# Patient Record
Sex: FEMALE | Race: WHITE
[De-identification: names, ages, dates, MRNs, and addresses within clinical notes are randomized per-mention and may not be internally consistent; named-entity substitution may affect disease eponyms.]

---

## 2018-09-23 ENCOUNTER — HOSPITAL ENCOUNTER (INPATIENT)
Dept: HOSPITAL 74 - FER | Age: 83
LOS: 2 days | Discharge: HOME HEALTH | DRG: 308 | End: 2018-09-25
Attending: NURSE PRACTITIONER | Admitting: INTERNAL MEDICINE
Payer: COMMERCIAL

## 2018-09-23 VITALS — BODY MASS INDEX: 24.4 KG/M2

## 2018-09-23 DIAGNOSIS — F32.9: ICD-10-CM

## 2018-09-23 DIAGNOSIS — I50.23: ICD-10-CM

## 2018-09-23 DIAGNOSIS — R50.9: ICD-10-CM

## 2018-09-23 DIAGNOSIS — I48.91: Primary | ICD-10-CM

## 2018-09-23 DIAGNOSIS — I11.0: ICD-10-CM

## 2018-09-23 DIAGNOSIS — R00.0: ICD-10-CM

## 2018-09-23 DIAGNOSIS — D72.829: ICD-10-CM

## 2018-09-23 DIAGNOSIS — M19.90: ICD-10-CM

## 2018-09-23 DIAGNOSIS — Z79.01: ICD-10-CM

## 2018-09-23 DIAGNOSIS — N39.0: ICD-10-CM

## 2018-09-23 DIAGNOSIS — E87.6: ICD-10-CM

## 2018-09-23 LAB
ALBUMIN SERPL-MCNC: 3.7 G/DL (ref 3.5–5)
ALP SERPL-CCNC: 95 U/L (ref 32–92)
ALT SERPL-CCNC: 42 U/L (ref 10–40)
ANION GAP SERPL CALC-SCNC: 10 MMOL/L (ref 8–16)
ANION GAP SERPL CALC-SCNC: 10 MMOL/L (ref 8–16)
APTT BLD: 39.5 SECONDS (ref 25.2–36.5)
AST SERPL-CCNC: 44 U/L (ref 10–42)
BACTERIA #/AREA URNS HPF: (no result) /HPF
BASOPHILS # BLD: 0.1 % (ref 0–2)
BILIRUB SERPL-MCNC: 0.8 MG/DL (ref 0.2–1)
BNP SERPL-MCNC: 1610.4 PG/ML (ref 5–450)
BUN SERPL-MCNC: 18 MG/DL (ref 7–18)
BUN SERPL-MCNC: 27 MG/DL (ref 7–18)
CALCIUM SERPL-MCNC: 8.7 MG/DL (ref 8.4–10.2)
CALCIUM SERPL-MCNC: 8.8 MG/DL (ref 8.4–10.2)
CASTS #/AREA URNS LPF: (no result) /HPF
CHLORIDE SERPL-SCNC: 99 MMOL/L (ref 98–107)
CHLORIDE SERPL-SCNC: 99 MMOL/L (ref 98–107)
CO2 SERPL-SCNC: 26 MMOL/L (ref 22–28)
CO2 SERPL-SCNC: 27 MMOL/L (ref 22–28)
CREAT SERPL-MCNC: 0.7 MG/DL (ref 0.6–1.3)
CREAT SERPL-MCNC: 0.7 MG/DL (ref 0.6–1.3)
DEPRECATED RDW RBC AUTO: 14.4 % (ref 11.6–15.6)
EOSINOPHIL # BLD: 0.6 % (ref 0–4.5)
EPITH CASTS URNS QL MICRO: (no result) /HPF
GLUCOSE SERPL-MCNC: 130 MG/DL (ref 74–106)
GLUCOSE SERPL-MCNC: 144 MG/DL (ref 74–106)
HCT VFR BLD CALC: 38.9 % (ref 32.4–45.2)
HGB BLD-MCNC: 12.6 GM/DL (ref 10.7–15.3)
INR BLD: 3.87 (ref 0.82–1.09)
LYMPHOCYTES # BLD: 9.7 % (ref 8–40)
MCH RBC QN AUTO: 29.7 PG (ref 25.7–33.7)
MCHC RBC AUTO-ENTMCNC: 32.5 G/DL (ref 32–36)
MCV RBC: 91.5 FL (ref 80–96)
MONOCYTES # BLD AUTO: 5.5 % (ref 3.8–10.2)
NEUTROPHILS # BLD: 84.1 % (ref 42.8–82.8)
PH UR: 7 [PH] (ref 4.5–8)
PLATELET # BLD AUTO: 309 K/MM3 (ref 134–434)
PMV BLD: 8.5 FL (ref 7.5–11.1)
POTASSIUM SERPLBLD-SCNC: 3.4 MMOL/L (ref 3.5–5.1)
POTASSIUM SERPLBLD-SCNC: 3.5 MMOL/L (ref 3.5–5.1)
PROT SERPL-MCNC: 7.1 G/DL (ref 6.4–8.3)
PROT UR QL STRIP: (no result)
PT PNL PPP: 42.2 SEC (ref 10.2–13)
RBC # BLD AUTO: (no result) /HPF (ref 0–3)
RBC # BLD AUTO: 4.25 M/MM3 (ref 3.6–5.2)
SODIUM SERPL-SCNC: 135 MMOL/L (ref 136–145)
SODIUM SERPL-SCNC: 136 MMOL/L (ref 136–145)
SP GR UR: 1.02 (ref 1–1.02)
UROBILINOGEN UR STRIP-MCNC: 0.2 MG/DL (ref 0.2–1)
WBC # BLD AUTO: 12.7 K/MM3 (ref 4–10.8)
WBC # UR AUTO: (no result) /UL (ref 0–5)

## 2018-09-23 RX ADMIN — ACETAMINOPHEN PRN MG: 325 TABLET ORAL at 23:50

## 2018-09-23 RX ADMIN — DIGOXIN ONE MG: 125 TABLET ORAL at 19:55

## 2018-09-23 RX ADMIN — DIGOXIN ONE: 125 TABLET ORAL at 19:56

## 2018-09-23 NOTE — PDOC
*Physical Exam





- Vital Signs


 Last Vital Signs











Temp Pulse Resp BP Pulse Ox


 


 100.7 F H  92 H  26 H  146/67   97 


 


 09/23/18 06:40  09/23/18 07:12  09/23/18 07:12  09/23/18 06:40  09/23/18 07:12














- Physical Exam


Comments: 





09/23/18 07:42


GENERAL: Awake, alert, and fully oriented, in no acute distress


EYES: PERRLA, sclera anicteric, conjunctiva clear


ENT: Hard of hearing. Moist mucosa


NECK: no JVD


LUNGS: +Rales at R lung base. Good air movement. No crackles. No increased WOB


HEART: Irregularly irregular, rate fluctuating btwn 90s-130s. +3/6 systolic 

ejection murmur


ABDOMEN: Soft, nontender, normoactive bowel sounds.  No guarding, no rebound.  

No masses


RECTAL: no bleeding, temp 100.7


EXTREMITIES: WWP. 1+ symmetric b/l pitting edema to the knees b/l


NEUROLOGICAL:  Normal speech, cranial nerves intact, 5/5 strength in all 4 

extremities, normal sensation to light touch in all 4 extremities


SKIN: Warm, Dry, normal turgor, no rashes or lesions noted.








ED Treatment Course





- LABORATORY


CBC & Chemistry Diagram: 


 09/23/18 06:45





 09/23/18 06:45





- RADIOLOGY


Radiology Studies Ordered: 














 Category Date Time Status


 


 CHEST X-RAY PORTABLE* [RAD] Stat Radiology  09/23/18 07:33 Ordered














- Medications


Given in the ED: 


ED Medications














Discontinued Medications














Generic Name Dose Route Start Last Admin





  Trade Name Hollisq  PRN Reason Stop Dose Admin


 


Acetaminophen  1,000 mg  09/23/18 06:40  09/23/18 07:00





  Ofirmev Injection -  IVPB  09/23/18 06:41  1,000 mg





  ONCE ONE   Administration





     





     





     





     


 


Diltiazem HCl  10 mg  09/23/18 06:33  09/23/18 06:39





  Cardizem Injection -  IVPUSH  09/23/18 06:34  10 mg





  ONCE ONE   Administration





     





     





     





     


 


Furosemide  40 mg  09/23/18 06:58  09/23/18 07:10





  Lasix Injection -  IVPUSH  09/23/18 06:59  40 mg





  ONCE ONE   Administration





     





     





     





     


 


Ceftriaxone Sodium 1 gm/  100 mls @ 200 mls/hr  09/23/18 06:41  09/23/18 07:10





  Dextrose  IVPB  09/23/18 07:10  200 mls/hr





  ONCE ONE   Administration





     





     





  Protocol   





     


 


Nitroglycerin  1.2 mg  09/23/18 06:33  09/23/18 06:39





  Nitrostat -  SL  09/23/18 06:34  1.2 mg





  ONCE ONE   Administration





     





     





     





     


 


Sodium Chloride  250 ml  09/23/18 06:34  09/23/18 07:20





  Normal Saline -  IV  09/23/18 06:35  Not Given





  ONCE ONE   





     





     





     





     














Medical Decision Making





- Medical Decision Making





09/23/18 07:53


Care recieved at 0700


Briefly, 88-year-old female with MMP including CHF presents with acute 

pulmonary edema, fever. Patient is now s/p 3 sublingual nitroglycerin, IV lasix 

40 mg, ceftriaxone 1 g, Zithromax 500mg, acetaminophen 1G, diltiazem 10mg.





On evaluation, HR fluctuating btwn 90s-140s, /80, RR 20, sat 100%





Pt in NAD, states she feels much better, denies CP, +minimal SOB. Exam with 

rales at lung base, systolic murmur, trace to 1+ LE pitting edema.


EKG reviewed, +KIKI in AVR and diffuse inferiolateral STD, likely rate related 

but will repeat once rate controlled. Pt with no CP and minimal SOB at this time

, unlikely ACS. 





Will hold off on bipap in light of response to medications so far and clinical 

improvement


All labs pending


Added digoxin level, CXR


Also ordered home dose of diltiazem 360mg ER (confirmed pt usually takes in AM 

with her daughter)


Will hold off on atenolol given acute decompensated CHF





Likely APE after high salt intake yesterday but given fever, pt may also have 

superimposed PNA. Murmur/RVR likely contributing to pulm edema. 





Discussed dispo with son, pt will need transfer to [facility] ICU vs tele once 

workup more complete. 


Plan to also call cards c/s





09/23/18 10:12


Pt clinically improved


HR now down to 90s-110s


CXR with mild congestion, +cardiomegaly


Case discussed with Dr. Negrete (cards), will send pt down to [facility] tele. 

Also discussed EKG, agrees it likely to be rate related changes and not STEMI 

in absence of CP/SOB 





Case discussed with Dr. Kenny - pt admitted to telemetry





Case discussed in detail with admitting physician including history, physical 

exam and ancillary studies.





Admitting physician has assumed care for the patient, will follow all pending 

diagnostics and will complete the evaluation and treatment.  





09/23/18 14:39


Pt and son do not want to go down to [facility] and prefer to stay at [facility]


Explained to patient that while she is stable now, if her clinical condition 

were to go south, we do not have the same resources here at [facility]. I explained 

that we do not have an ICU here which means if she were to decompensate, she 

would have to be transferred out. I also explained that cardiologist Dr. Negrete recommends she go down to [facility] as well, especially because we do not 

know the extent of her CHF (unknown EF). Pt and son express understanding of 

this recommendation and the risks of staying, but still prefer to stay at [facility]. Pt's son states they have a DNR for their mother should her heart 

stop. 





Updated Dr. Negrete/Efraín about the above. Will request bed at [facility]. 





*DC/Admit/Observation/Transfer


Diagnosis at time of Disposition: 


 Pneumonia, Rapid atrial fibrillation, Pulmonary edema








- Discharge Dispostion


Condition at time of disposition: Guarded


Decision to Admit order: Yes





- Referrals





- Patient Instructions





- Post Discharge Activity





- Attestations


Physician Attestion: 





09/23/18 10:14








I, Dr. Cindy Nichols MD, attest that this document has been prepared under my 

direction and personally reviewed by me in its entirety.   I further attest, 

that it accurately reflects all work, treatment, procedures and medical decision

-making performed by me.

## 2018-09-23 NOTE — EKG
Test Reason : 

Blood Pressure : ***/*** mmHG

Vent. Rate : 113 BPM     Atrial Rate : 092 BPM

   P-R Int : 000 ms          QRS Dur : 080 ms

    QT Int : 338 ms       P-R-T Axes : 000 055 186 degrees

   QTc Int : 463 ms

 

ATRIAL FIBRILLATION WITH RAPID VENTRICULAR RESPONSE

MODERATE VOLTAGE CRITERIA FOR LVH, MAY BE NORMAL VARIANT

MARKED ST ABNORMALITY, POSSIBLE INFEROLATERAL SUBENDOCARDIAL INJURY

ABNORMAL ECG

NO PREVIOUS ECGS AVAILABLE

Confirmed by MD Zachary, Ronan ([id number]) on 9/23/2018 5:07:57 PM

 

Referred By: MD GARCIA           Confirmed By:Ronan Sharma MD

## 2018-09-23 NOTE — HP
CHIEF COMPLAINT: SOB, palpitations, not feeling well





PCP: in [place]





HISTORY OF PRESENT ILLNESS:


This is an 88 year old female with a past medical history significant for afib 

on coumadin and HTN who presented to the ED due to SOB, palpitations and not 

feeling well. Pt was noted to be febrile upon arrival to the ED. Upon exam pt 

reports feeling much better. The SOB and palpitations have resolved.





ER course was notable for:


(1) Trop 0.04


(2) BNP 1610


(3) lactic acid 2.5, then up to 3.4





Recent Travel:


pt denies





PAST MEDICAL HISTORY:


HTN, Afib on coumadin, depression, arthritis


PAST SURGICAL HISTORY:


appendectomy as a child





Social History:


Smoking: pt denies


Alcohol: pt denies


Drugs:  pt denies





Family History:


Family all lived in [facility], unknown COD. Brother  "young" but unknown why





Allergies


Sulfa (Sulfonamide Antibiotics) Allergy (Verified 18 06:32)


 


HOME MEDICATIONS:


 3





 Medication  Instructions  Recorded


 


Atenolol [Tenormin] 25 mg PO DAILY 18


 


Digoxin [Lanoxin -] 0.125 mg PO DAILY 18


 


Diltiazem HCl [Diltiazem ER] 360 mg PO DAILY 18


 


Furosemide [Lasix] 20 mg PO DAILY 18


 


Prednisone 5 mg PO DAILY 18


 


Sertraline HCl 100 mg PO DAILY 18


 


Tramadol HCl 50 mg PO QID PRN  18


 


Warfarin Sodium [Coumadin] 2.5 mg PO DAILY 18








REVIEW OF SYSTEMS


CONSTITUTIONAL: 


Absent:  fever, chills, diaphoresis, generalized weakness, malaise, loss of 

appetite, weight change


HEENT: 


Absent:  rhinorrhea, nasal congestion, throat pain, throat swelling, difficulty 

swallowing, mouth swelling, ear pain, eye pain, visual changes


CARDIOVASCULAR: Present: palpitations


Absent: chest pain, syncope, irregular heart rate, lightheadedness, peripheral 

edema


RESPIRATORY: Present: shortness of breath


Absent: cough, dyspnea with exertion, orthopnea, wheezing, stridor, hemoptysis


GASTROINTESTINAL:


Absent: abdominal pain, abdominal distension, nausea, vomiting, diarrhea, 

constipation, melena, hematochezia


GENITOURINARY: 


Absent: dysuria, frequency, urgency, hesitancy, hematuria, flank pain, genital 

pain


MUSCULOSKELETAL: 


Absent: myalgia, arthralgia, joint swelling, back pain, neck pain


SKIN: 


Absent: rash, itching, pallor


HEMATOLOGIC/IMMUNOLOGIC: 


Absent: easy bleeding, easy bruising, lymphadenopathy, frequent infections


ENDOCRINE:


Absent: unexplained weight gain, unexplained weight loss, heat intolerance, 

cold intolerance


NEUROLOGIC: 


Absent: headache, focal weakness or paresthesias, dizziness, unsteady gait, 

seizure, mental status changes, bladder or bowel incontinence


PSYCHIATRIC: 


Absent: anxiety, depression, suicidal or homicidal ideation, hallucinations.








PHYSICAL EXAMINATION


Vital Signs - 24 hr





 3





  09/23/18 09/23/18 09/23/18





  06:22 06:40 07:12


 


Temperature  100.7 F H 


 


Pulse Rate 152 H  


 


Pulse Rate [  121 H 92 H





Apical]   


 


Respiratory 30 H 24 H 26 H





Rate   


 


Blood Pressure 161/93  


 


Blood Pressure  146/67 





[Arm]   


 


O2 Sat by Pulse 100  97





Oximetry (%)   








 3





  09/23/18 09/23/18 09/23/18





  07:43 10:13 11:11


 


Temperature   99.1 F


 


Pulse Rate   


 


Pulse Rate [ 114 H 103 H 87





Apical]   


 


Respiratory 20 18 20





Rate   


 


Blood Pressure   


 


Blood Pressure 134/74 131/93 147/90





[Arm]   


 


O2 Sat by Pulse 96 98 98





Oximetry (%)   








 3





  09/23/18 09/23/18 09/23/18





  11:48 12:15 13:29


 


Temperature  99.1 F 


 


Pulse Rate 93 H  


 


Pulse Rate [  83 73





Apical]   


 


Respiratory  20 18





Rate   


 


Blood Pressure   


 


Blood Pressure  142/83 133/54 L





[Arm]   


 


O2 Sat by Pulse 99 98 99





Oximetry (%)   








 3





  09/23/18 09/23/18 09/23/18





  15:35 16:58 17:11


 


Temperature  97.8 F 


 


Pulse Rate  68 


 


Pulse Rate [ 65  





Apical]   


 


Respiratory 17 18 





Rate   


 


Blood Pressure  137/82 


 


Blood Pressure 140/83  





[Arm]   


 


O2 Sat by Pulse 98  98





Oximetry (%)   








 3





  09/23/18 09/23/18 09/23/18





  19:00 19:55 20:08


 


Temperature 99.1 F  


 


Pulse Rate 120 H 123 H 


 


Pulse Rate [   





Apical]   


 


Respiratory 21 H  21 H





Rate   


 


Blood Pressure 154/84  


 


Blood Pressure   





[Arm]   


 


O2 Sat by Pulse   98





Oximetry (%)   








GENERAL: Awake, alert, and fully oriented, in no acute distress.


HEAD: Normal with no signs of trauma.


EYES: Pupils equal, round and reactive to light, extraocular movements intact, 

sclera anicteric, conjunctiva clear. No lid lag.


EARS, NOSE, THROAT: Ears normal, nares patent, oropharynx clear without 

exudates. Moist mucous membranes.


NECK: Normal range of motion, supple without lymphadenopathy, JVD, or masses.


LUNGS: Breath sounds equal, clear to auscultation bilaterally. No wheezes, and 

no crackles. No accessory muscle use.


HEART: Irregular rate and rhythm, normal S1 and S2 without murmur, rub or 

gallop.


ABDOMEN: Soft, nontender, not distended, normoactive bowel sounds, no guarding, 

no rebound, no masses.  No hepatomegaly or  splenomegaly. 


MUSCULOSKELETAL: Normal range of motion at all joints. No bony deformities or 

tenderness. No CVA tenderness.


UPPER EXTREMITIES: 2+ pulses, warm, well-perfused. No cyanosis. No clubbing. No 

peripheral edema.


LOWER EXTREMITIES: 2+ pulses, warm, well-perfused. No calf tenderness. No 

peripheral edema. 


NEUROLOGICAL:  Cranial nerves II-XII intact. Normal speech. Normal gait.


PSYCHIATRIC: Cooperative. Good eye contact. Appropriate mood and affect.


SKIN: Warm, dry, normal turgor, no rashes or lesions noted, normal capillary 

refill. 





Laboratory Results - last 24 hr





 3





  18





  06:45 06:45 06:45


 


WBC    12.7 H


 


RBC    4.25


 


Hgb    12.6


 


Hct    38.9


 


MCV    91.5


 


MCH    29.7


 


MCHC    32.5


 


RDW    14.4


 


Plt Count    309


 


MPV    8.5


 


Absolute Neuts (auto)    10.7


 


Neutrophils %    84.1 H


 


Lymphocytes %    9.7


 


Monocytes %    5.5


 


Eosinophils %    0.6


 


Basophils %    0.1


 


PT with INR  42.2 H  


 


INR  3.87 H  


 


PTT (Actin FS)  39.5 H  


 


Sodium   


 


Potassium   


 


Chloride   


 


Carbon Dioxide   


 


Anion Gap   


 


BUN   


 


Creatinine   


 


Creat Clearance w eGFR   


 


Random Glucose   


 


Lactic Acid   


 


Calcium   


 


Total Bilirubin   


 


AST   


 


ALT   


 


Alkaline Phosphatase   


 


Creatine Kinase   


 


Troponin I   


 


B-Natriuretic Peptide   


 


Total Protein   


 


Albumin   


 


Urine Color   Yellow 


 


Urine Appearance   Clear 


 


Urine pH   7.0 


 


Ur Specific Gravity   1.025 


 


Urine Protein   3+ H 


 


Urine Glucose (UA)   Trace 


 


Urine Ketones   Negative 


 


Urine Blood   Trace-intact H 


 


Urine Nitrite   Negative 


 


Urine Bilirubin   Negative 


 


Urine Urobilinogen   0.2 


 


Ur Leukocyte Esterase   Negative 


 


Urine RBC   2-5 


 


Urine WBC   2-5 


 


Ur Epithelial Cells   Few 


 


Urine Bacteria   Few 


 


Urine Casts   Finely granular 0-1 


 


Digoxin   








 3





  18   





  06:45 06:45 07:26 07:31 14:56 15:02


 


WBC      


 


RBC      


 


Hgb      


 


Hct      


 


MCV      


 


MCH      


 


MCHC      


 


RDW      


 


Plt Count      


 


MPV      


 


Absolute Neuts (auto)      


 


Neutrophils %      


 


Lymphocytes %      


 


Monocytes %      


 


Eosinophils %      


 


Basophils %      


 


PT with INR      


 


INR      


 


PTT (Actin FS)      


 


Sodium  135 L     


 


Potassium  3.5     


 


Chloride  99     


 


Carbon Dioxide  26     


 


Anion Gap  10     


 


BUN  27 H     


 


Creatinine  0.7     


 


Creat Clearance w eGFR  > 60     


 


Random Glucose  144 H     


 


Lactic Acid     2.5 H*  3.4 H* 


 


Calcium  8.8     


 


Total Bilirubin  0.8     


 


AST  44 H     


 


ALT  42 H     


 


Alkaline Phosphatase  95 H     


 


Creatine Kinase     29   29


 


Troponin I    0.04    0.06


 


B-Natriuretic Peptide     1610.4 H  


 


Total Protein  7.1     


 


Albumin  3.7     


 


Urine Color      


 


Urine Appearance      


 


Urine pH      


 


Ur Specific Gravity      


 


Urine Protein      


 


Urine Glucose (UA)      


 


Urine Ketones      


 


Urine Blood      


 


Urine Nitrite      


 


Urine Bilirubin      


 


Urine Urobilinogen      


 


Ur Leukocyte Esterase      


 


Urine RBC      


 


Urine WBC      


 


Ur Epithelial Cells      


 


Urine Bacteria      


 


Urine Casts      


 


Digoxin   Cancelled   0.99  








 3





  18





  20:40 20:40 20:40


 


WBC   


 


RBC   


 


Hgb   


 


Hct   


 


MCV   


 


MCH   


 


MCHC   


 


RDW   


 


Plt Count   


 


MPV   


 


Absolute Neuts (auto)   


 


Neutrophils %   


 


Lymphocytes %   


 


Monocytes %   


 


Eosinophils %   


 


Basophils %   


 


PT with INR   


 


INR   


 


PTT (Actin FS)   


 


Sodium   136 


 


Potassium   3.4 L 


 


Chloride   99 


 


Carbon Dioxide   27 


 


Anion Gap   10 


 


BUN   18 


 


Creatinine   0.7 


 


Creat Clearance w eGFR   > 60 


 


Random Glucose   130 H 


 


Lactic Acid    2.1 H


 


Calcium   8.7 


 


Total Bilirubin   


 


AST   


 


ALT   


 


Alkaline Phosphatase   


 


Creatine Kinase   28 


 


Troponin I  0.05  


 


B-Natriuretic Peptide   


 


Total Protein   


 


Albumin   


 


Urine Color   


 


Urine Appearance   


 


Urine pH   


 


Ur Specific Gravity   


 


Urine Protein   


 


Urine Glucose (UA)   


 


Urine Ketones   


 


Urine Blood   


 


Urine Nitrite   


 


Urine Bilirubin   


 


Urine Urobilinogen   


 


Ur Leukocyte Esterase   


 


Urine RBC   


 


Urine WBC   


 


Ur Epithelial Cells   


 


Urine Bacteria   


 


Urine Casts   


 


Digoxin   








ECG


Atrial fibrillation


vent rate 78, 


mod voltage criteria for LVH


ST depression lead II, v4-V6; TWI lead 3, aVF





Radiology Reports


CXR


Impression: Enlarged cardiac silhouette. Mild pulmonary vascular congestion. No 

evidence of airspace consolidation. No sizable pleural effusion. 


Reported By: Grant Vines DO 18 [id number]








ASSESSMENT/PLAN:


88yF with PMH Afib on coumadin, HTN, depression, arthritis presented to the ED 

with SOB and palpitations.





Afib with RVR


   - rate variable 80s-120s, currently febrile. Will give tylenol. if not 

improved then cardizem IVP


   - cardiology consult


   - troponin neg x 3


   - cont tele


supratherapeutic INR


   - no s/s bleeding, hold coumadin


CHF


   - echo in am


   - good response to lasix >2L output


   - cardiology consult


   - will defer daily lasix dosing to cardiology in am


   - daily weights


   - I&O


HTN


   - BP slightly elevated, cont home meds, adjust if remains persistently 

elevated





fever


   - CXR without clear infiltrate, u/a without indication of UTI


   - given zithro and ceftriaxone in ed


   - flu swab taken


   - consider CT scan





Hypokalemia


   - kdur 20mEq po now, repeat in am





depression


   - cont home sertraline





arthritis


   - cont home tramadol





DVT PPX


   - resume home coumadin when INR less than 3





FEN


   - hold IVF


   - BMP in am


   - low sodium diet as tolerated





Dispo: pt currently requires further inpatient management of her emergent 

condition








Visit type





- Emergency Visit


Emergency Visit: Yes


ED Registration Date: 18


Care time: The patient presented to the Emergency Department on the above date 

and was hospitalized for further evaluation of their emergent condition.





- New Patient


This patient is new to me today: Yes


Date on this admission: 18





- Critical Care


Critical Care patient: No





Hospitalist Screening





- Colonoscopy Questionnaire


Colonoscopy Questionnaire: 





Colonoscopy Questionnaire








-   Patient:


50 - 75 years old and never had a screening colonoscopy: No


History of colon or rectal polyps, or CA: Unknown


History of IBD, Crohn's disease or UC: Unknown


History of abdominal radiation therapy as a child: Unknown





-   Relative:


1 with colon or rectal CA, or polyps at age 60 or younger: Unknown


Colon or rectal CA diagnosed at age 45 or younger: Unknown


Multiple relatives with colon or rectal CA: Unknown





-   Outcome:


Screening Result: Negative Screen

## 2018-09-23 NOTE — PDOC
History of Present Illness





- General


Chief Complaint: Shortness of Breath


Stated Complaint: SOB


Time Seen by Provider: 09/23/18 06:20


History Source: Patient, Family


Exam Limitations: No Limitations





- History of Present Illness


Initial Comments: 





09/23/18 06:59


Pt comes with SOB and not feeling well. She is visiting from [facility] where her 

PMD is.


Timing/Duration: 1-3 hours, 4-6 hours


Severity: severe


Associated Symptoms: reports: cough, fever/chills, malaise, shortness of breath

, weakness





Past History





- Travel


Traveled outside of the country in the last 30 days: No





- Past Medical History


Allergies/Adverse Reactions: 


 Allergies











Allergy/AdvReac Type Severity Reaction Status Date / Time


 


Sulfa (Sulfonamide Allergy   Verified 09/23/18 06:32





Antibiotics)     











Home Medications: 


Ambulatory Orders





Atenolol [Tenormin] 25 mg PO DAILY 09/23/18 


Digoxin [Lanoxin -] 0.125 mg PO DAILY 09/23/18 


Diltiazem HCl [Diltiazem ER] 360 mg PO DAILY 09/23/18 


Furosemide [Lasix] 20 mg PO DAILY 09/23/18 


Prednisone 5 mg PO DAILY 09/23/18 


Sertraline HCl 100 mg PO DAILY 09/23/18 


Tramadol HCl PRN 09/23/18 


Warfarin Sodium [Coumadin] 2.5 mg PO DAILY 09/23/18 








Cardiac Disorders: Yes (AFIB)


COPD: No


HTN: Yes


Psychiatric Problems: Yes (DEPRESSION)


Other medical history: ARTHRITIS





- Suicide/Smoking/Psychosocial Hx


Smoking History: Never smoked


Substance Use Type: None





**Review of Systems





- Review of Systems


Able to Perform ROS?: Yes


Is the patient limited English proficient: No


Constitutional: Yes: Diaphoresis, Fever, Malaise.  No: Symptoms Reported, See 

HPI, Chills, Loss of Appetite, Night Sweats, Weakness, Weight Stable, 

Unintentional Wgt. Loss, Unexplained wgt Loss, Other


HEENTM: No: Symptoms Reported, See HPI, Eye Pain, Blurred Vision, Tearing, 

Recent change in vision, Double Vision, Cataracts, Ear Pain, Ocular Prothesis, 

Ear Discharge, Nose Pain, Nose Congestion, Tinnitus, Nose Bleeding, Hearing Loss

, Throat Pain, Throat Swelling, Mouth Pain, Dental Problems, Difficulty 

Swallowing, Mouth Swelling, Other


Respiratory: Yes: Cough, Shortness of Breath, Wheezing.  No: Symptoms reported, 

See HPI, Orthopnea, SOB with Exertion, SOB at Rest, Stridor, Productive cough, 

Hemoptysis, Other


Cardiac (ROS): Yes: Irregular Heart Rate.  No: Symptoms Reported, See HPI, 

Chest Pain, Edema, Lightheadedness, Palpitations, Syncope, Chest Tightness, 

Other


ABD/GI: No: Symptoms Reported, See HPI, Abdominal Distended, Abd. Pain w/ 

defecation, Blood Streaked Bowels, Constipated, Diarrhea, Difficulty Swallowing

, Nausea, Poor Appetite, Poor Fluid Intake, Rectal Bleeding, Vomiting, 

Indigestion, Abdominal cramping, Tarry Stools, Other


: No: Symptoms Reported, See HPI, Burning, Dysuria, Discharge, Frequency, 

Flank Pain, Hematuria, Incontinence, Pain, Urgency, Testicular Mass, Testicular 

Swelling, Lesions, Testicular Pain, Other


Musculoskeletal: No: Symptoms Reported, See HPI, Back Pain, Gout, Joint Pain, 

Joint Swelling, Muscle Pain, Muscle Weakness, Neck Pain, Joint Stiffness, Other


Integumentary: No: Symptoms Reported, See HPI, Bruising, Change in Color, 

Change in Hair/Nails, Dryness, Erythema, Flushing, Lesions, Lumps, Pallor, 

Pruritus, Rash, Sweating, Other


Psychiatric: No: Anxiety, Depression, Frequent Crying, Stressors, Sleep Pattern 

Change, Emotional Problems, Mood Swings, Change in Appetite, Other





*Physical Exam





- Vital Signs


 Last Vital Signs











Temp Pulse Resp BP Pulse Ox


 


    152 H  30 H  161/93   100 


 


    09/23/18 06:22  09/23/18 06:22  09/23/18 06:22  09/23/18 06:22














- Physical Exam


General Appearance: Yes: Nourished, Severe Distress.  No: Appropriately Dressed

, Apparent Distress, Disheveled, Mild Distress, Moderate Distress, Alcohol on 

Breath, Intoxicated, Cachetic, Obese, Thin, Other


HEENT: positive: EOMI, BROWN, Normal ENT Inspection, Normal Voice, Symmetrical, 

TMs Normal, Pharynx Normal.  negative: Pale Conjunctivae, Photophobia, Scleral 

Icterus (R), Scleral Icterus (L), Muffled/Hoarse voice, Pharyngeal Erythema, 

Tonsillar Exudate, Tonsillar Erythema, Nasal Congestion, Rhinorrhea, Sinus 

Tenderness, Orbits, Hearing Decreased, Hearing Grossly Normal, TM Bulging, TM 

Dull, TM Erythema, Lesions, Marks, Excessive drooling, Thrush, Other


Neck: positive: Trachea midline, Supple.  negative: Tender, Normal Thyroid, 

Rigid, Carotid bruit, Decreased range of motion, Stridor, Lymphadenopathy (R), 

Lymphadenopathy (L), Rigidity, Tender lateral, Tender midline, Thyromegaly, 

Other


Respiratory/Chest: positive: Labored Respiration, Rales, Rhonchi.  negative: 

Chest Tender, Lungs Clear, Normal Breath Sounds, Respiratory Distress, 

Accessory Muscle Use, Rapid RR, Decreased Breath Sounds, Paradoxal Breathing, 

Crackles, Stridor, Wheezing, Hyperresonant, Dullness, Plerual Rub, Other


Cardiovascular: positive: Tachycardia, Irregularly Irregular.  negative: 

Regular Rhythm, Regular Rate, S1, S2, Edema, JVD, Murmur, Bradycardia, 

Diastolic Murmur, Systolic Murmur, Gallop/S3, Gallop/S4, Irregular, Other


Female Pelvic Exam: positive: normal external exam.  negative: cervical os 

closed, normal adnexa, normal size ovaries, CMT, discharge, lesions, Bartholin 

mass, Scalene Gland, adnexal tenderness, uterus, Urethra, vaginal bleeding, 

other


Gastrointestinal/Abdominal: positive: Normal Bowel Sounds, Flat, Soft.  negative

: Tender, Organomegaly, Pulsatile Mass, Increased Bowel Sounds, Decreased BS, 

Protuberent, Distended, Guarding, Rebound, Tenderness, Hernia, Mass, 

Hepatomegaly, Spleenomegaly, Other


Musculoskeletal: positive: Normal Inspection.  negative: CVA Tenderness


Extremity: positive: Normal Capillary Refill, Normal Inspection, Normal Range 

of Motion, Pelvis Stable.  negative: Tender, Coldness, Cyanosis, Delayed 

Capillary Refill, Pedal Edema, Swelling, Calf Tenderness, Erythema, Inflammation

, Other


Integumentary: positive: Normal Color, Dry, Warm.  negative: Cyanotic, Erythema

, Jaundice, Mottled, Pale, Cold, Clammy, Diaphoresis, Moist, Hives, Petechiae, 

Rash, Swelling, Ecchymosis, Bruising, Other


Neurologic: positive: CNs II-XII NML intact, Fully Oriented, Alert, Normal Mood/

Affect, Normal Response (pt has pitting edema of legs), Motor Strength 5/5





Medical Decision Making





- Medical Decision Making





09/23/18 07:02


Pt arrived with EMS; BP >188 systolic. 100% NRB; SOB and tachyarrhythmia


Given 3 SLNTG as well as 10 diltiazem.  BP came down to 145systolic and HR went 

from 140s to 80s.


Pt's bloods drawn; lactic acid and blood culture as she is febriole 100.7F


Pt received ofirmev and lasix 40mgIV


Santana cath placed.


Pt received 1g Rocephin.





SHE STILL REQUIRES zithromax





Labs pending.


CXR ordered.


BiPAP called for - on standby, as pt improved with initial care.





Pt likely pneumonia, rapid afib; APE.


She went out yesterday and ate salty foods and things that she usually never 

eats.





Pt will be signed out to the day attending.


She needs admission to med surg [facility]ITAL[facility]





*DC/Admit/Observation/Transfer


Diagnosis at time of Disposition: 


 Pneumonia, Rapid atrial fibrillation, Pulmonary edema








- Discharge Dispostion


Condition at time of disposition: Guarded





- Referrals





- Patient Instructions





- Post Discharge Activity

## 2018-09-24 LAB
ALBUMIN SERPL-MCNC: 3.5 G/DL (ref 3.5–5)
ALP SERPL-CCNC: 65 U/L (ref 32–92)
ALT SERPL-CCNC: 32 U/L (ref 10–40)
ANION GAP SERPL CALC-SCNC: 10 MMOL/L (ref 8–16)
AST SERPL-CCNC: 30 U/L (ref 10–42)
BASOPHILS # BLD: 0.3 % (ref 0–2)
BILIRUB SERPL-MCNC: 1.2 MG/DL (ref 0.2–1)
BUN SERPL-MCNC: 13 MG/DL (ref 7–18)
CALCIUM SERPL-MCNC: 8.5 MG/DL (ref 8.4–10.2)
CHLORIDE SERPL-SCNC: 101 MMOL/L (ref 98–107)
CO2 SERPL-SCNC: 26 MMOL/L (ref 22–28)
CREAT SERPL-MCNC: < 0.6 MG/DL (ref 0.6–1.3)
DEPRECATED RDW RBC AUTO: 14.2 % (ref 11.6–15.6)
EOSINOPHIL # BLD: 0.9 % (ref 0–4.5)
GLUCOSE SERPL-MCNC: 134 MG/DL (ref 74–106)
HCT VFR BLD CALC: 39.2 % (ref 32.4–45.2)
HGB BLD-MCNC: 12.6 GM/DL (ref 10.7–15.3)
INR BLD: 2.1 (ref 0.82–1.09)
LYMPHOCYTES # BLD: 10.2 % (ref 8–40)
MAGNESIUM SERPL-MCNC: 1.9 MG/DL (ref 1.8–2.4)
MCH RBC QN AUTO: 29.6 PG (ref 25.7–33.7)
MCHC RBC AUTO-ENTMCNC: 32.3 G/DL (ref 32–36)
MCV RBC: 91.9 FL (ref 80–96)
MONOCYTES # BLD AUTO: 9.6 % (ref 3.8–10.2)
NEUTROPHILS # BLD: 79 % (ref 42.8–82.8)
PLATELET # BLD AUTO: 267 K/MM3 (ref 134–434)
PMV BLD: 8.2 FL (ref 7.5–11.1)
POTASSIUM SERPLBLD-SCNC: 3.5 MMOL/L (ref 3.5–5.1)
PROT SERPL-MCNC: 6.9 G/DL (ref 6.4–8.3)
PT PNL PPP: 23.2 SEC (ref 10.2–13)
RBC # BLD AUTO: 4.26 M/MM3 (ref 3.6–5.2)
SODIUM SERPL-SCNC: 137 MMOL/L (ref 136–145)
WBC # BLD AUTO: 7.5 K/MM3 (ref 4–10.8)

## 2018-09-24 RX ADMIN — ACETAMINOPHEN PRN MG: 325 TABLET ORAL at 07:19

## 2018-09-24 RX ADMIN — PREDNISONE SCH MG: 5 TABLET ORAL at 09:46

## 2018-09-24 RX ADMIN — ACETAMINOPHEN PRN MG: 325 TABLET ORAL at 17:32

## 2018-09-24 RX ADMIN — ATENOLOL SCH MG: 25 TABLET ORAL at 22:52

## 2018-09-24 RX ADMIN — FUROSEMIDE SCH MG: 20 TABLET ORAL at 09:44

## 2018-09-24 RX ADMIN — CEFTRIAXONE SCH MLS/HR: 1 INJECTION, POWDER, FOR SOLUTION INTRAMUSCULAR; INTRAVENOUS at 09:45

## 2018-09-24 RX ADMIN — DIGOXIN SCH MG: 125 TABLET ORAL at 09:45

## 2018-09-24 RX ADMIN — ATENOLOL SCH MG: 25 TABLET ORAL at 09:45

## 2018-09-24 RX ADMIN — Medication SCH TAB: at 10:30

## 2018-09-24 RX ADMIN — SERTRALINE HYDROCHLORIDE SCH MG: 50 TABLET ORAL at 09:44

## 2018-09-24 NOTE — ECHO
______________________________________________________________________________



Name: SEMETIS, FOFO                                    Exam:Adult Echocardiogram

MRN: [id number]         Study Date: 2018 02:07 PM

Age: 88 yrs

______________________________________________________________________________



Reason For Study: CHF

Height: 60 in        Weight: 126 lb        BSA: 1.5 m2



______________________________________________________________________________



MMode/2D Measurements & Calculations

IVSd: 1.6 cm                                          Ao root diam: 2.6 cm

LVIDd: 3.2 cm                                         LA dimension: 4.3 cm

LVIDs: 2.4 cm

LVPWd: 1.5 cm



_______________________________________________________

EDV(Teich): 40.8 ml                                   LVOT diam: 2.0 cm

ESV(Teich): 20.1 ml



Doppler Measurements & Calculations

MV E max jefry: 160.7 cm/sec

MV A max jefry: 86.4 cm/sec                             MV dec slope: 908.0 cm/sec2

MV E/A: 1.9



_______________________________________________________

Ao V2 max: 290.0 cm/sec                               LV V1 max P.6 mmHg

Ao max P.8 mmHg                                  LV V1 mean P.1 mmHg

Ao V2 mean: 203.9 cm/sec                              LV V1 max: 176.5 cm/sec

Ao mean P.4 mmHg                                 LV V1 mean: 133.1 cm/sec

Ao V2 VTI: 66.3 cm                                    LV V1 VTI: 39.1 cm



VELASQUEZ(I,D): 1.8 cm2

VELASQUEZ(V,D): 1.9 cm2

_______________________________________________________



MR max jefry: 471.9 cm/sec                              SV(LVOT): 120.3 ml

MR max P.8 mmHg

_______________________________________________________



TR max jefry: 253.2 cm/sec                              PI end-d jefry: 113.1 cm/sec

TR max P.9 mmHg



______________________________________________________________________________



Procedure

A complete two-dimensional transthoracic echocardiogram was performed (2D, M-mode, Doppler and color 
flow

Doppler).

Left Ventricle

The left ventricle is normal in size. There is moderate concentric left ventricular hypertrophy. Left


ventricular systolic function is normal. Ejection Fraction = 60-65%. No regional wall motion abnormal
ities

noted.

Right Ventricle

The right ventricle is normal size. The right ventricular systolic function is normal.

Atria

The left atrium is mildly dilated. Right atrial size is normal.

Mitral Valve

There is moderate mitral annular calcification. There is mild mitral regurgitation.

Tricuspid Valve

The tricuspid valve is normal in structure and function. There is moderate tricuspid regurgitation. P
ulmonary

artery systolic pressure is at least 44 mmHg assuming RA pressure of 3 mmHg.

Aortic Valve

There is moderate aortic sclerosis.;. Moderate aortic regurgitation.

Pulmonic Valve

The pulmonic valve is not well visualized. Mild pulmonic valvular regurgitation.

Great Vessels

The aortic root is normal size.

Pericardium/Pleura

There is no pericardial effusion.

______________________________________________________________________________





Interpretation Summary

The left ventricle is normal in size.

There is moderate concentric left ventricular hypertrophy.

Left ventricular systolic function is normal.

No regional wall motion abnormalities noted.

Ejection Fraction = 60-65%.

The right ventricular systolic function is normal.

The left atrium is mildly dilated.

Right atrial size is normal.

There is moderate mitral annular calcification.

There is mild mitral regurgitation.

There is moderate tricuspid regurgitation.

Pulmonary artery systolic pressure is at least 44 mmHg assuming RA pressure of 3 mmHg

There is moderate aortic sclerosis. Possilbe mild aortic valve stenosis with mean gradient of 19 mmHg


Moderate aortic regurgitation.

Mild pulmonic valvular regurgitation.

There is no pericardial effusion.



Prior study is not available for comparison





Delgado rAgueta MD 2018 06:33 PM

## 2018-09-24 NOTE — CON.CARD
Cardiology Consult (text)





- Consultation


Consultation Note: 





Cardiology Consult





88F with AF on Warfarin, presents to ER with SOB and pulmonary vascular 

congestion in setting of dietary indiscretion. Also found to febrile and + UTI


She is a poor historian; a call was placed to her son to obtain further hx.


Today, she denies CP, SOB- stating she feels beter. 


No PND or orthopnea overnight.


+ fever and chills.





Denies hx of MI or coronary revasc.





PMH:


AF on Coumadin


HTN


Unclear why on Prednisone daily








ALL:


Sulfa








FH:


Non-contrib





SH:


Lives in [place], visiting family.








DATA:


Microbiology





09/23/18 07:31   Urine - Urine - Catheterized   Urine Culture - Preliminary


                              Lactose Fermenting Neg Bacilli


09/23/18 06:45   Blood - Peripheral Venous   Blood Culture - Preliminary


                              NO GROWTH OBTAINED AFTER 24 HOURS, INCUBATION TO 

CONTINUE


                              FOR 4 DAYS.


09/23/18 06:45   Blood - Peripheral Venous   Blood Culture - Preliminary


                              NO GROWTH OBTAINED AFTER 24 HOURS, INCUBATION TO 

CONTINUE


                              FOR 4 DAYS.





 Selected Entries











  09/23/18 09/24/18





  23:00 08:16


 


Temperature 101.3 F H 99.3 F


 


Pulse Rate  121 H


 


Blood Pressure  150/60








 Laboratory Tests











  09/23/18 09/23/18 09/23/18





  07:31 15:02 20:40


 


WBC   


 


Hgb   


 


Plt Count   


 


INR   


 


Sodium   


 


Potassium   


 


BUN   


 


Creatinine   


 


Lactic Acid   


 


Calcium   


 


Magnesium   


 


Total Bilirubin   


 


AST   


 


ALT   


 


Alkaline Phosphatase   


 


Troponin I   0.06  0.05


 


B-Natriuretic Peptide  1610.4 H  














  09/23/18 09/24/18 09/24/18





  20:40 07:00 07:00


 


WBC   7.5 


 


Hgb   12.6 


 


Plt Count   267 


 


INR   


 


Sodium    137


 


Potassium    3.5


 


BUN    13


 


Creatinine    < 0.6 L


 


Lactic Acid  2.1 H  


 


Calcium    8.5


 


Magnesium    1.9


 


Total Bilirubin    1.2 H


 


AST    30  D


 


ALT    32  D


 


Alkaline Phosphatase    65  D


 


Troponin I   


 


B-Natriuretic Peptide   














  09/24/18





  07:00


 


WBC 


 


Hgb 


 


Plt Count 


 


INR  2.10 H


 


Sodium 


 


Potassium 


 


BUN 


 


Creatinine 


 


Lactic Acid 


 


Calcium 


 


Magnesium 


 


Total Bilirubin 


 


AST 


 


ALT 


 


Alkaline Phosphatase 


 


Troponin I 


 


B-Natriuretic Peptide 











Exam:


S1, 2. RRR. Loud systolic murmur audible throughout precordium @ RUSB and at 

apex


No carotid bruits


Chest CTA this AM


Abd soft and NT


Ext no edema





ECG:


AF with LVH and diffuse NSST changes likely due to dig effect/ +/- repol changes

















IMP:


Chronic AF with RVR in setting of UTI, fever and decompensated CHF


Chronic CHF: ? systolic vs diastolic vs valvular vs combined


UTI


?PNA








REC:


1. Continue tele


2. Echo for EF assessment and to assess valvular fxn, murmur- r/o AS


3. INR goal 2-3.


4. Would increase Atenolol to BID dosing as early AM heart rate is elevated.


5. Abx as per PMD


6. Call has been placed to son to clarify history.

## 2018-09-24 NOTE — EKG
Test Reason : 

Blood Pressure : ***/*** mmHG

Vent. Rate : 078 BPM     Atrial Rate : 068 BPM

   P-R Int : 000 ms          QRS Dur : 086 ms

    QT Int : 398 ms       P-R-T Axes : 000 065 237 degrees

   QTc Int : 453 ms

 

ATRIAL FIBRILLATION

MODERATE VOLTAGE CRITERIA FOR LVH, MAY BE NORMAL VARIANT

MARKED ST ABNORMALITY, POSSIBLE INFERIOR SUBENDOCARDIAL INJURY

ABNORMAL ECG

WHEN COMPARED WITH ECG OF 23-SEP-2018 06:36,

T WAVE INVERSION LESS EVIDENT IN LATERAL LEADS

Confirmed by LAURITA WADSWORTH MD ([id number]) on 9/24/2018 12:18:07 PM

 

Referred By: LOR HUMMEL           Confirmed By:LAURITA WADSWORTH MD

## 2018-09-24 NOTE — PN
Physical Exam: 


SUBJECTIVE: Patient seen and examined, patient is sitting up in bed, eating 

breakfast, denies any chest pain or shortness of breath.  reports discomfort 

from guo cather.  





OBJECTIVE:  Patient is a 87 y/o female with a past medical history of afib (

coumadin),  hypertension, HLD, and depression. Patient was admitted from the 

emergency department for rapid afib and fever of unknown injury.  





 Vital Signs











 Period  Temp  Pulse  Resp  BP Sys/Iqbal  Pulse Ox


 


 Last 24 Hr  97.8 F-101.3 F    17-21  131-162/54-93  91-99











GENERAL: The patient is awake, alert, and fully oriented, in no acute distress.


HEAD: Normal with no signs of trauma.


EYES: PERRL, extraocular movements intact, sclera anicteric, conjunctiva clear. 

No ptosis. 


ENT: Ears normal, nares patent, oropharynx clear without exudates, moist mucous 


membranes.


NECK: Trachea midline, full range of motion, supple. 


LUNGS: Breath sounds equal, clear to apexes, fine crackles to bases, no wheezes

, no accessory muscle use. 


HEART: irregular rate and rhythm, S1, S2 without murmur, rub or gallop.


ABDOMEN: Soft, nontender, nondistended, normoactive bowel sounds, no guarding, 

no 


rebound, no hepatosplenomegaly, no masses.


: guo clear yellow urine draining 


EXTREMITIES: 2+ pulses, warm, well-perfused, +1 pre-tibial pitting edema to 

bilateral lower extremity.   


NEUROLOGICAL: Cranial nerves II through XII grossly intact. Normal speech, gait 

not 


observed.


PSYCH: Normal mood, normal affect.


SKIN: Warm, dry, normal turgor, no rashes or lesions noted














 Laboratory Results - last 24 hr








 CBC











WBC  7.5 K/mm3 (4.0-10.8)   09/24/18  07:00    


 


RBC  4.26 M/mm3 (3.60-5.2)   09/24/18  07:00    


 


Hgb  12.6 GM/dl (10.7-15.3)   09/24/18  07:00    


 


Hct  39.2 % (32.4-45.2)   09/24/18  07:00    


 


MCV  91.9 fl (80-96)   09/24/18  07:00    


 


MCH  29.6 pg (25.7-33.7)   09/24/18  07:00    


 


MCHC  32.3 g/dl (32.0-36.0)   09/24/18  07:00    


 


RDW  14.2 % (11.6-15.6)   09/24/18  07:00    


 


Plt Count  267 K/MM3 (134-434)   09/24/18  07:00    


 


MPV  8.2 fl (7.5-11.1)   09/24/18  07:00    


 


Absolute Neuts (auto)  5.9 K/mm3  09/24/18  07:00    


 


Neutrophils %  79.0 % (42.8-82.8)   09/24/18  07:00    


 


Lymphocytes %  10.2 % (8-40)   09/24/18  07:00    


 


Monocytes %  9.6 % (3.8-10.2)   09/24/18  07:00    


 


Eosinophils %  0.9 % (0-4.5)   09/24/18  07:00    


 


Basophils %  0.3 % (0-2.0)   09/24/18  07:00    








 CMP











Sodium  137 mmol/L (136-145)   09/24/18  07:00    


 


Potassium  3.5 mmol/L (3.5-5.1)   09/24/18  07:00    


 


Chloride  101 mmol/L ()   09/24/18  07:00    


 


Carbon Dioxide  26 mmol/L (22-28)   09/24/18  07:00    


 


Anion Gap  10 MMOL/L (8-16)   09/24/18  07:00    


 


BUN  13 mg/dl (7-18)   09/24/18  07:00    


 


Creatinine  < 0.6 mg/dl (0.6-1.3)  L  09/24/18  07:00    


 


Creat Clearance w eGFR  > 60  (>60)   09/24/18  07:00    


 


Random Glucose  134 mg/dl ()  H  09/24/18  07:00    


 


Lactic Acid  2.1 mmol/L (0.4-2.0)  H  09/23/18  20:40    


 


Calcium  8.5 mg/dl (8.4-10.2)   09/24/18  07:00    


 


Magnesium  1.9 mg/dL (1.8-2.4)   09/24/18  07:00    


 


Total Bilirubin  1.2 mg/dl (0.2-1.0)  H  09/24/18  07:00    


 


AST  30 U/L (10-42)  D 09/24/18  07:00    


 


ALT  32 U/L (10-40)  D 09/24/18  07:00    


 


Alkaline Phosphatase  65 U/L (32-92)  D 09/24/18  07:00    


 


Creatine Kinase  28 IU/L ()   09/23/18  20:40    


 


Troponin I  0.05 ng/ml (0.00-0.06)   09/23/18  20:40    


 


B-Natriuretic Peptide  1610.4 pg/ml (5-450)  H  09/23/18  07:31    


 


Total Protein  6.9 g/dl (6.4-8.3)   09/24/18  07:00    


 


Albumin  3.5 g/dl (3.5-5.0)   09/24/18  07:00    








 Laboratory Tests











  09/24/18





  07:00


 


INR  2.10 H























Active Medications











Generic Name Dose Route Start Last Admin





  Trade Name Freq  PRN Reason Stop Dose Admin


 


Acetaminophen  650 mg  09/23/18 23:44  09/24/18 07:19





  Tylenol -  PO   650 mg





  Q6H PRN   Administration





  FEVER   





     





     





     


 


Atenolol  25 mg  09/23/18 20:00  09/23/18 19:57





  Tenormin -  PO   25 mg





  DAILY KRISTINE   Administration





     





     





     





     


 


Digoxin  0.125 mg  09/24/18 10:00  





  Lanoxin -  PO   





  DAILY KRISTINE   





     





     





     





     


 


Diltiazem HCl  360 mg  09/24/18 10:00  





  Cardizem Cd -  PO   





  DAILY [facility]   





     





     





     





     


 


Potassium Chloride  40 meq  09/24/18 08:11  





  K-Dur -  PO  09/24/18 08:12  





  ONCE ONE   





     





     





     





     


 


Prednisone  5 mg  09/24/18 10:00  





  Deltasone -  PO   





  DAILY KRISTINE   





     





     





     





     


 


Sertraline HCl  100 mg  09/24/18 10:00  





  Zoloft -  PO   





  DAILY KRISTINE   





     





     





     





     


 


Tramadol HCl  50 mg  09/23/18 23:12  09/24/18 00:29





  Ultram -  PO   50 mg





  QID PRN   Administration





  PAIN LEVEL 6-10   





     





     





     








 Microbiology





09/23/18 06:45   Blood - Peripheral Venous   Blood Culture - Preliminary


                            NO GROWTH OBTAINED AFTER 24 HOURS, INCUBATION TO 

CONTINUE


                            FOR 4 DAYS.


09/23/18 06:45   Blood - Peripheral Venous   Blood Culture - Preliminary


                            NO GROWTH OBTAINED AFTER 24 HOURS, INCUBATION TO 

CONTINUE


                            FOR 4 DAYS.


09/24/18 00:00   Nasopharyngeal Swab   Influenza Types A,B Antigen - Final, 

negative 


09/24/18 00:00   Nasopharyngeal Swab    - Final, negative 


 


09/23/18 07:31   Urine - Urine - Catheterized   Urine Culture - Preliminary


                            Lactose Fermenting Neg Bacilli








IMAGING


chest xray: mild pulmolnary vascular congestion, no infilitrate 





ASSESSMENT/PLAN:





1) cardiovascular


Afib with RVR


- continous cardiac monitoring, rate remains 110-120's patient is asymptomatic, 

will give PO dose of cardizem now, cardizem 10mg IV x 1 ordered, continue home 

dose atenolol


- pending echo today


- troponin x 3 wnl


- restart coumadin today 


-appreciate cardiology input


acute excerbation of chronic congestive heart failure


- pending echo today


- pt diuressed yesterday, 1.1kg weight loss noted, will continue home dose 

cardizem


- repeat cxr today 


hypertension


- b/p is above goal, increase atenolol to bid and continue cardizem, strict  b/

p monitoring q4h





2) sepsis


secondary to UTI


- prelim urine culture, lactose fermenting negative bacili, will start rocephin

, pending final urine culture, blood culture is negative to date, leukocytosis 

resolved,  low grade temp is noted





3) psych


depression


- continue home dose zoloft





f/e/n


- replete potassium and mg


- low sodium diet





ppx


-  resume home coumadin 


-  pepcid


- physical therapy evaluation 











Dispo: pt currently requires further inpatient management of her emergent 

condition





Visit type





- Emergency Visit


Emergency Visit: Yes


ED Registration Date: 09/23/18


Care time: The patient presented to the Emergency Department on the above date 

and was hospitalized for further evaluation of their emergent condition.





- New Patient


This patient is new to me today: Yes


Date on this admission: 09/24/18





- Critical Care


Critical Care patient: No





- Discharge Referral


Referred to [facility] No

## 2018-09-25 VITALS — HEART RATE: 61 BPM

## 2018-09-25 VITALS — TEMPERATURE: 98.4 F | DIASTOLIC BLOOD PRESSURE: 67 MMHG | SYSTOLIC BLOOD PRESSURE: 154 MMHG

## 2018-09-25 LAB
INR BLD: 1.76 (ref 0.82–1.09)
PT PNL PPP: 19.5 SEC (ref 10.2–13)

## 2018-09-25 RX ADMIN — DIGOXIN SCH MG: 125 TABLET ORAL at 10:39

## 2018-09-25 RX ADMIN — FUROSEMIDE SCH MG: 20 TABLET ORAL at 10:39

## 2018-09-25 RX ADMIN — CEFTRIAXONE SCH MLS/HR: 1 INJECTION, POWDER, FOR SOLUTION INTRAMUSCULAR; INTRAVENOUS at 10:40

## 2018-09-25 RX ADMIN — Medication SCH TAB: at 10:39

## 2018-09-25 RX ADMIN — PREDNISONE SCH MG: 5 TABLET ORAL at 10:39

## 2018-09-25 RX ADMIN — SERTRALINE HYDROCHLORIDE SCH MG: 50 TABLET ORAL at 10:40

## 2018-09-25 NOTE — PN
Progress Note (short form)





- Note


Progress Note: 





ID Consult dictated


+ blood c/s probable contaminant


UTI


Fever/ leukocytosis  improved


Repeat BC


OK for discharge on po keflex 500mg po bid x7d

## 2018-09-25 NOTE — DS
Physical Exam: 


SUBJECTIVE: Patient seen and examined, sitting at bedside recliner, tolerating 

diet reports feeling well denies any chest pain palpitations and shortness of 

breath, ready for discharge home








OBJECTIVE:This is an 88 year old female with a past medical history significant 

for afib on coumadin and HTN who presented to the ED due to SOB, palpitations 

and not feeling well. Pt was noted to be febrile upon arrival to the ED. Upon 

exam pt reports feeling much better. The SOB and palpitations have resolved.





ER course was notable for:


(1) Trop 0.04


(2) BNP 1610


(3) lactic acid 2.5, then up to 3.4





 Vital Signs











 Period  Temp  Pulse  Resp  BP Sys/Iqbal  Pulse Ox


 


 Last 24 Hr  97.6 F-98.5 F  58-67  18-20  121-154/52-67  94-98








PHYSICAL EXAM





GENERAL: The patient is awake, alert, and fully oriented, in no acute distress.


HEAD: Normal with no signs of trauma.


EYES: PERRL, extraocular movements intact, sclera anicteric, conjunctiva clear. 


ENT: Ears normal, nares patent, oropharynx clear without exudates, moist mucous 

membranes.


NECK: Trachea midline, full range of motion, supple. 


LUNGS: Breath sounds equal, clear to auscultation bilaterally, no wheezes, no 

crackles, no accessory muscle use. 


HEART: irregular rate and rhythm, S1, S2 without murmur, rub or gallop.


ABDOMEN: Soft, nontender, nondistended, normoactive bowel sounds, no guarding, 

no rebound, no hepatosplenomegaly, no masses.


EXTREMITIES: 2+ pulses, warm, well-perfused, no edema. 


NEUROLOGICAL: Cranial nerves II through XII grossly intact. Normal speech, gait 

not observed.


PSYCH: Normal mood, normal affect.


SKIN: Warm, dry, normal turgor, no rashes or lesions noted.





LABS


 Laboratory Results - last 24 hr











  09/25/18





  06:55


 


PT with INR  19.5 H


 


INR  1.76 H





 Microbiology





09/23/18 06:45   Blood - Peripheral Venous   Blood Culture - Preliminary


                            Pending Organism


09/23/18 06:45   Blood - Peripheral Venous   Blood Culture - Preliminary


                            NO GROWTH OBTAINED AFTER 48 HOURS, INCUBATION TO 

CONTINUE


                            FOR 3 DAYS.


09/23/18 07:31   Urine - Urine - Catheterized   Urine Culture - Final


                            Escherichia Coli


09/24/18 00:00   Nasopharyngeal Swab   Influenza Types A,B Antigen - Final


09/24/18 00:00   Nasopharyngeal Swab    - Final





 


IMAGING 


cxr 9/24/18: no CHFor pneumonia noted





Echo 09/24/2018 moderate concentric LVH, EF 60-65% moderate TR and mild AS





HOSPITAL COURSE:


1) Afib with RVR


-rate better controlled today 60-70 on cardiac monitoring continuos, continue 

atenolol with parameters and cardizem 


- troponin x 3 wnl


- coumadin restarte 9/24/18-->home dose 


- cardiology Dr Osuna consulted and followed 


acute excerbation of chronic systolic congestive heart failure


- diuressed on  hospital day 1, 1 kg weight loss, Patient appears euvolemic on 

exam, continue home dose Lasix 





hypertension


- BP: Continue Cardizem and atenolol





2) sepsis


resolved secondary to UTI


-One set of blood cultures positive discussed with Dr Graves, ID, likely 

contaminant,  repeat blood cultures ordered, leukocytosis resolved will 

continue rocephin 





3) psych


depression


- continue home dose zoloft





PLAN


- discharge home with keflex 500mg bid for 7 days


- repeat inr on thursday, september 27th


- discharge plan and instructions discussed with daughter-in-law Annalise, 

verbalizes understanding all questions answered





Date of Admission:09/23/18





Date of Discharge: 09/25/18











Minutes to complete discharge: 45





Discharge Summary


Reason For Visit: PULMONARY EDEMA,  RAPID ATRIAL FIBRILLATION


Current Active Problems





Pneumonia (Acute)


Pulmonary edema (Acute)


Rapid atrial fibrillation (Acute)








Condition: Improved





- Instructions


Diet, Activity, Other Instructions: 


your atenolol was increased to twice daily as per the recommendation of the 

cardiologist


continue Cardizem daily as prescribed


Continue lasix prescribed please continue to weigh yourself daily in the morning


continue Coumadin 2.5 mg daily as prescribed and repeat INR on Thursday, September 27, 2018


your urine culture was positive for bacteria and  you were treated for urinary 

tract infection while in the hospital please continue Keflex (antibiotic) twice 

a day for the next 7 days


Please continue probiotic daily





Please follow-up with your cardiologist within 2 weeks


Please follow-up with your primary care physician within one week for a repeat 

urinalysis and urine culture


Please repeat your INR on Thursday, 09/27/2018


Referrals: 


Jesus Osuna MD [Staff Physician] - 


Disposition: VNS/HOME HEALTH CARE





- Home Medications


Comprehensive Discharge Medication List: 


Ambulatory Orders





Atenolol [Tenormin] 25 mg PO DAILY 09/23/18 


Digoxin [Lanoxin -] 0.125 mg PO DAILY 09/23/18 


Diltiazem HCl [Diltiazem ER] 360 mg PO DAILY 09/23/18 


Furosemide [Lasix] 20 mg PO DAILY 09/23/18 


Prednisone 5 mg PO DAILY 09/23/18 


Sertraline HCl 100 mg PO DAILY 09/23/18 


Tramadol HCl 50 mg PO QID PRN  09/23/18 


Warfarin Sodium [Coumadin] 2.5 mg PO DAILY 09/23/18 








This patient is new to me today: No


Emergency Visit: Yes


ED Registration Date: 09/23/18


Care time: The patient presented to the Emergency Department on the above date 

and was hospitalized for further evaluation of their emergent condition.


Critical Care patient: No





- Discharge Referral


Referred to [facility] No

## 2018-09-25 NOTE — CONS
DATE OF CONSULTATION:  

 

DATE OF DICTATION:  02/25/2018 

 

An 88-year-old female evaluated for positive blood culture.  She was admitted to the

hospital on September 23, 2018, with shortness of breath.  She was diagnosed with

atrial fibrillation with rapid ventricular response and congestive heart failure. 

Her course was complicated by fever and elevated white blood cell count.  Cultures

were obtained.  She was empirically treated with ceftriaxone.  Urine culture was

positive for E. coli.  Blood cultures are now positive for gram-positive cocci in

chains in 1 bottle.  

 

She is clinically improved.  She is out of bed to chair.  She complains of

generalized pain; however, denies any chest pain, shortness of breath, cough or

sputum production.  No dysuria or hematuria.  No skin infections.  An echocardiogram

was performed and was negative for valvular vegetations.  Repeat cultures are

pending.  

 

PAST MEDICAL HISTORY:  Positive for hypertension, atrial fibrillation,

osteoarthritis, depression.

 

ALLERGIES:  SULFA.

 

MEDICATIONS:  Atenolol, Lanoxin, diltiazem, Lasix, prednisone, tramadol, Coumadin.

 

SOCIAL HISTORY:  She lives at home.  She is a nonsmoker, nondrinker.  

 

REVIEW OF SYSTEMS:  

Neurologic:  No loss of consciousness, seizure activity, focal weakness.

Cardiac:  Negative for chest pain or palpitations.

Respiratory:  Negative for cough or sputum production.  

Gastrointestinal:  Negative vomiting or diarrhea.

Genitourinary:  Positive for urinary tract infection.

 

LABORATORY DATA:  White count on admission 12.7, presently 7.5.  Hematocrit 39.2,

platelet count 267, creatinine 0.6.  Blood cultures:  Gram-positive cocci in chains,

1 bottle.  Urinalysis:  White cells 2-5.  Urine culture:  E. coli.  Chest x-ray

negative for acute infiltrate.  

 

PHYSICAL EXAMINATION:  

General:  She is out of bed to chair.  She is not acutely toxic appearing.  Elderly

female.

Vital Signs:  Temperature 98.4, T-max 101.3.  Blood pressure 154/67, pulse 65,

irregular.  Respirations 20 per minute.  

HEENT:  Sclerae are anicteric. 

Cardiovascular:  Heart sounds irregular S1, S2.  No murmur.  

Lungs:  Clear bilaterally.  

Abdomen:  Soft.  No tenderness elicited.  No mass, rebound, or rigidity.  

Extremities:  Edema 1+.  No evidence of skin infection noted.  

 

IMPRESSION:  

1.  Status post rapid atrial fibrillation/congestive heart failure.

2.  Escherichia coli urinary tract infection. 

3.  Positive blood culture for alpha streptococcus in 1 bottle.  

 

Blood culture likely contaminant.  Will repeat blood cultures.  May substitute Keflex

500 mg p.o. b.i.d. for an additional 7 days for treatment of urinary tract infection.

 No objection to discharge.  

 

Thank you for the kind referral.  

 

 

EMILIA ROJAS M.D.

 

[id number]

DD: 09/25/2018 11:33

DT: 09/25/2018 12:00

Job #:  [id number]

## 2018-09-25 NOTE — PN
Physical Exam: 


SUBJECTIVE: Patient seen and examined








OBJECTIVE:





 Vital Signs











 Period  Temp  Pulse  Resp  BP Sys/Iqbal  Pulse Ox


 


 Last 24 Hr  97.6 F-98.5 F  58-67  18-20  121-154/52-67  94-98











GENERAL: The patient is awake, alert, and fully oriented, in no acute distress.


HEAD: Normal with no signs of trauma.


EYES: PERRL, extraocular movements intact, sclera anicteric, conjunctiva clear. 

No ptosis. 


ENT: Ears normal, nares patent, oropharynx clear without exudates, moist mucous 


membranes.


NECK: Trachea midline, full range of motion, supple. 


LUNGS: Breath sounds equal, clear to auscultation bilaterally, no wheezes, no 

crackles, no 


accessory muscle use. 


HEART: Regular rate and rhythm, S1, S2 without murmur, rub or gallop.


ABDOMEN: Soft, nontender, nondistended, normoactive bowel sounds, no guarding, 

no 


rebound, no hepatosplenomegaly, no masses.


EXTREMITIES: 2+ pulses, warm, well-perfused, no edema. 


NEUROLOGICAL: Cranial nerves II through XII grossly intact. Normal speech, gait 

not 


observed.


PSYCH: Normal mood, normal affect.


SKIN: Warm, dry, normal turgor, no rashes or lesions noted














 Laboratory Results - last 24 hr











  09/25/18





  06:55


 


PT with INR  19.5 H


 


INR  1.76 H








Active Medications











Generic Name Dose Route Start Last Admin





  Trade Name Freq  PRN Reason Stop Dose Admin


 


Acetaminophen  650 mg  09/23/18 23:44  09/24/18 17:32





  Tylenol -  PO   650 mg





  Q6H PRN   Administration





  FEVER   





     





     





     


 


Atenolol  25 mg  09/24/18 10:00  09/24/18 22:52





  Tenormin -  PO   25 mg





  BID KRISTINE   Administration





     





     





     





     


 


Digoxin  0.125 mg  09/24/18 10:00  09/24/18 09:45





  Lanoxin -  PO   0.125 mg





  DAILY KRISTINE   Administration





     





     





     





     


 


Diltiazem HCl  360 mg  09/24/18 08:30  09/24/18 08:39





  Cardizem Cd -  PO   360 mg





  DAILY KRISTINE   Administration





     





     





     





     


 


Furosemide  20 mg  09/24/18 10:00  09/24/18 09:44





  Lasix -  PO   20 mg





  DAILY KRISTINE   Administration





     





     





     





     


 


Ceftriaxone Sodium  1 gm in 50 mls @ 100 mls/hr  09/24/18 10:00  09/24/18 09:45





  Rocephin 1gm Ivpb (Pre-Docked)  IVPB   100 mls/hr





  DAILY KRISTINE   Administration





     





     





  Protocol   





     


 


Lactobacillus Acidophilus  1 tab  09/24/18 10:15  09/24/18 10:30





  Bacid -  PO   1 tab





  DAILY KRISTINE   Administration





     





     





     





     


 


Prednisone  5 mg  09/24/18 10:00  09/24/18 09:46





  Deltasone -  PO   5 mg





  DAILY KRISTINE   Administration





     





     





     





     


 


Sertraline HCl  100 mg  09/24/18 10:00  09/24/18 09:44





  Zoloft -  PO   100 mg





  DAILY KRISTINE   Administration





     





     





     





     


 


Tramadol HCl  50 mg  09/23/18 23:12  09/24/18 00:29





  Ultram -  PO   50 mg





  QID PRN   Administration





  PAIN LEVEL 6-10   





     





     





     


 


Warfarin Sodium  2.5 mg  09/24/18 18:00  09/24/18 17:32





  Coumadin -  PO   2.5 mg





  DAILY@1800 KRISTINE   Administration





     





     





     





     








 Microbiology





09/23/18 06:45   Blood - Peripheral Venous   Blood Culture - Preliminary


                            Pending Organism


09/23/18 06:45   Blood - Peripheral Venous   Blood Culture - Preliminary


                            NO GROWTH OBTAINED AFTER 48 HOURS, INCUBATION TO 

CONTINUE


                            FOR 3 DAYS.


09/23/18 07:31   Urine - Urine - Catheterized   Urine Culture - Final


                            Escherichia Coli


09/24/18 00:00   Nasopharyngeal Swab   Influenza Types A,B Antigen - Final, 

negative 


09/24/18 00:00   Nasopharyngeal Swab    - Final, negative 





IMAGING 


cxr 9/24/18: no CHFor pneumonia noted





Echo 09/24/2018 moderate concentric LVH, EF 60-65% moderate TR and mild AS





ASSESSMENT/PLAN:


) cardiovascular


Afib with RVR


-rate better controlled today 60-70 on ardiac monitoring continous, continue 

atenolol with parameters and cardizem 


- troponin x 3 wnl


- continue coumadin


- cardiology Dr Osuna consulted and followed 


acute excerbation of chronic congestive heart failure


- Patient appears euvolemic on exam, continue home dose Lasix 





hypertension


- BP: Continue Cardizem and atenolol





2) sepsis


secondary to UTI


-One set of blood cultures positive, repeat blood cultures ordered, Dr. Graves 

ID consulted leukocytosis resolved will continue rocephin 





3) psych


depression


- continue home dose zoloft





f/e/n


- replete potassium and mg


- low sodium diet





ppx


-  continue coumadin 


-  pepcid


- physical therapy evaluation 











Dispo: pt currently requires further inpatient management of her emergent 

condition





Visit type





- Emergency Visit


Emergency Visit: Yes


ED Registration Date: 09/23/18


Care time: The patient presented to the Emergency Department on the above date 

and was hospitalized for further evaluation of their emergent condition.





- New Patient


This patient is new to me today: No





- Critical Care


Critical Care patient: No





- Discharge Referral


Referred to [facility] No

## 2018-09-25 NOTE — DS
Physical Exam: 


SUBJECTIVE: Patient seen and examined








OBJECTIVE:





 Vital Signs











 Period  Temp  Pulse  Resp  BP Sys/Iqbal  Pulse Ox


 


 Last 24 Hr  97.6 F-99.3 F    18-20  110-154/52-67  94-99








PHYSICAL EXAM





GENERAL: The patient is awake, alert, and fully oriented, in no acute distress.


HEAD: Normal with no signs of trauma.


EYES: PERRL, extraocular movements intact, sclera anicteric, conjunctiva clear. 


ENT: Ears normal, nares patent, oropharynx clear without exudates, moist mucous 

membranes.


NECK: Trachea midline, full range of motion, supple. 


LUNGS: Breath sounds equal, clear to auscultation bilaterally, no wheezes, no 

crackles, no accessory muscle use. 


HEART: Regular rate and rhythm, S1, S2 without murmur, rub or gallop.


ABDOMEN: Soft, nontender, nondistended, normoactive bowel sounds, no guarding, 

no rebound, no hepatosplenomegaly, no masses.


EXTREMITIES: 2+ pulses, warm, well-perfused, no edema. 


NEUROLOGICAL: Cranial nerves II through XII grossly intact. Normal speech, gait 

not observed.


PSYCH: Normal mood, normal affect.


SKIN: Warm, dry, normal turgor, no rashes or lesions noted.





LABS


 Laboratory Results - last 24 hr











  09/24/18 09/24/18 09/24/18





  07:00 07:00 07:00


 


WBC  7.5  


 


RBC  4.26  


 


Hgb  12.6  


 


Hct  39.2  


 


MCV  91.9  


 


MCH  29.6  


 


MCHC  32.3  


 


RDW  14.2  


 


Plt Count  267  


 


MPV  8.2  


 


Absolute Neuts (auto)  5.9  


 


Neutrophils %  79.0  


 


Lymphocytes %  10.2  


 


Monocytes %  9.6  


 


Eosinophils %  0.9  


 


Basophils %  0.3  


 


PT with INR    23.2 H


 


INR    2.10 H


 


Sodium   137 


 


Potassium   3.5 


 


Chloride   101 


 


Carbon Dioxide   26 


 


Anion Gap   10 


 


BUN   13 


 


Creatinine   < 0.6 L 


 


Creat Clearance w eGFR   > 60 


 


Random Glucose   134 H 


 


Calcium   8.5 


 


Magnesium   1.9 


 


Total Bilirubin   1.2 H 


 


AST   30  D 


 


ALT   32  D 


 


Alkaline Phosphatase   65  D 


 


Total Protein   6.9 


 


Albumin   3.5 











HOSPITAL COURSE:





Date of Admission:09/23/18





Date of Discharge: 09/25/18














Discharge Summary


Reason For Visit: PULMONARY EDEMA,  RAPID ATRIAL FIBRILLATION


Current Active Problems





Pneumonia (Acute)


Pulmonary edema (Acute)


Rapid atrial fibrillation (Acute)








Condition: Guarded





- Instructions





- Home Medications


Comprehensive Discharge Medication List: 


Ambulatory Orders





Atenolol [Tenormin] 25 mg PO DAILY 09/23/18 


Digoxin [Lanoxin -] 0.125 mg PO DAILY 09/23/18 


Diltiazem HCl [Diltiazem ER] 360 mg PO DAILY 09/23/18 


Furosemide [Lasix] 20 mg PO DAILY 09/23/18 


Prednisone 5 mg PO DAILY 09/23/18 


Sertraline HCl 100 mg PO DAILY 09/23/18 


Tramadol HCl 50 mg PO QID PRN  09/23/18 


Warfarin Sodium [Coumadin] 2.5 mg PO DAILY 09/23/18 











- Discharge Referral


Referred to [facility] No

## 2018-11-20 ENCOUNTER — APPOINTMENT (RX ONLY)
Dept: URBAN - METROPOLITAN AREA CLINIC 91 | Facility: CLINIC | Age: 83
Setting detail: DERMATOLOGY
End: 2018-11-20

## 2018-11-20 DIAGNOSIS — L29.89 OTHER PRURITUS: ICD-10-CM

## 2018-11-20 PROBLEM — I10 ESSENTIAL (PRIMARY) HYPERTENSION: Status: ACTIVE | Noted: 2018-11-20

## 2018-11-20 PROBLEM — L29.8 OTHER PRURITUS: Status: ACTIVE | Noted: 2018-11-20

## 2018-11-20 PROBLEM — I48.91 UNSPECIFIED ATRIAL FIBRILLATION: Status: ACTIVE | Noted: 2018-11-20

## 2018-11-20 PROBLEM — H91.90 UNSPECIFIED HEARING LOSS, UNSPECIFIED EAR: Status: ACTIVE | Noted: 2018-11-20

## 2018-11-20 PROBLEM — F32.9 MAJOR DEPRESSIVE DISORDER, SINGLE EPISODE, UNSPECIFIED: Status: ACTIVE | Noted: 2018-11-20

## 2018-11-20 PROBLEM — M12.9 ARTHROPATHY, UNSPECIFIED: Status: ACTIVE | Noted: 2018-11-20

## 2018-11-20 PROCEDURE — ? COUNSELING

## 2018-11-20 PROCEDURE — ? DIAGNOSIS COMMENT

## 2018-11-20 PROCEDURE — ? TREATMENT REGIMEN

## 2018-11-20 PROCEDURE — ? PRESCRIPTION

## 2018-11-20 PROCEDURE — 99202 OFFICE O/P NEW SF 15 MIN: CPT

## 2018-11-20 RX ORDER — TRIAMCINOLONE ACETONIDE 1 MG/G
CREAM TOPICAL BID
Qty: 1 | Refills: 2 | Status: ERX | COMMUNITY
Start: 2018-11-20

## 2018-11-20 RX ORDER — CETIRIZINE HCL/PSEUDOEPHEDRINE 5 MG-120MG
TABLET, EXTENDED RELEASE 12 HR ORAL QD
Qty: 30 | Refills: 3 | Status: ERX | COMMUNITY
Start: 2018-11-20

## 2018-11-20 RX ADMIN — TRIAMCINOLONE ACETONIDE: 1 CREAM TOPICAL at 19:09

## 2018-11-20 RX ADMIN — Medication: at 19:10

## 2018-11-20 ASSESSMENT — LOCATION DETAILED DESCRIPTION DERM
LOCATION DETAILED: LEFT PROXIMAL DORSAL FOREARM
LOCATION DETAILED: RIGHT INFERIOR MEDIAL UPPER BACK
LOCATION DETAILED: RIGHT PROXIMAL DORSAL FOREARM

## 2018-11-20 ASSESSMENT — LOCATION ZONE DERM
LOCATION ZONE: TRUNK
LOCATION ZONE: ARM

## 2018-11-20 ASSESSMENT — LOCATION SIMPLE DESCRIPTION DERM
LOCATION SIMPLE: RIGHT UPPER BACK
LOCATION SIMPLE: RIGHT FOREARM
LOCATION SIMPLE: LEFT FOREARM

## 2018-11-20 NOTE — PROCEDURE: DIAGNOSIS COMMENT
Detail Level: Detailed
Comment: Advised to RTC if rash recurs otherwise follow up with a Derm in [place] since she is leaving in less than a week. Patient and daughter voiced understanding.

## 2018-11-20 NOTE — PROCEDURE: MIPS QUALITY
Detail Level: Detailed
Quality 111:Pneumonia Vaccination Status For Older Adults: Pneumococcal Vaccination Previously Received
Quality 130: Documentation Of Current Medications In The Medical Record: Current Medications Documented
Quality 110: Preventive Care And Screening: Influenza Immunization: Influenza Immunization not Administered because Patient Refused.
Quality 131: Pain Assessment And Follow-Up: Pain assessment using a standardized tool is documented as negative, no follow-up plan required

## 2018-11-20 NOTE — PROCEDURE: COUNSELING
Detail Level: Zone
Patient Specific Counseling (Will Not Stick From Patient To Patient): Dry skin care sheet discussed and provided.

## 2018-11-20 NOTE — HPI: SKIN IRRITATION
How Severe Is Your Skin Irritation?: mild
Additional History: Skin irritation started when she was in [place] where she lives. she is visiting her daughter here for the [address] States no changes in habits and no new medications. Patient applies alcohol and takes cetirizine tablets from her PCP in [place]. Pain 0/10.